# Patient Record
Sex: MALE | Race: WHITE | URBAN - METROPOLITAN AREA
[De-identification: names, ages, dates, MRNs, and addresses within clinical notes are randomized per-mention and may not be internally consistent; named-entity substitution may affect disease eponyms.]

---

## 2019-01-11 ENCOUNTER — OFFICE VISIT (OUTPATIENT)
Dept: ORTHOPEDICS | Facility: CLINIC | Age: 59
End: 2019-01-11
Payer: COMMERCIAL

## 2019-01-11 VITALS
SYSTOLIC BLOOD PRESSURE: 125 MMHG | DIASTOLIC BLOOD PRESSURE: 86 MMHG | HEIGHT: 72 IN | BODY MASS INDEX: 22.35 KG/M2 | HEART RATE: 101 BPM | WEIGHT: 165 LBS

## 2019-01-11 DIAGNOSIS — M54.16 LUMBAR BACK PAIN WITH RADICULOPATHY AFFECTING RIGHT LOWER EXTREMITY: Primary | ICD-10-CM

## 2019-01-11 DIAGNOSIS — M51.369 LUMBAR DEGENERATIVE DISC DISEASE: ICD-10-CM

## 2019-01-11 RX ORDER — GABAPENTIN 100 MG/1
100 CAPSULE ORAL 3 TIMES DAILY
Qty: 90 CAPSULE | Refills: 3 | Status: SHIPPED | OUTPATIENT
Start: 2019-01-11 | End: 2020-01-11

## 2019-01-11 RX ORDER — AMLODIPINE BESYLATE 2.5 MG/1
TABLET ORAL
Refills: 0 | COMMUNITY
Start: 2018-12-04

## 2019-01-11 ASSESSMENT — MIFFLIN-ST. JEOR: SCORE: 1607.47

## 2019-01-11 NOTE — LETTER
Date:January 14, 2019      Patient was self referred, no letter generated. Do not send.        [facility] Health Information

## 2019-01-11 NOTE — PROGRESS NOTES
[facility]  Orthopedics  Cesar Staples,   2019     Name: Luca San  MRN: [id number]  Age: 58 year old  : 1960  Referring provider: Referred Self     Chief Complaint:  Lower back pain    Date of Injury:  Late 2018    History of Present Illness:   Luca San is a 58 year old male who presents today for evaluation of low back pain and right leg pain. He reports that in late 2018 he began to have low back pain. He believes his pain was due to either twisting while lifting a heavy box or getting under another heavy object to lift it. His pain started off as quite mild. After a week, however, he began to have severe low back pain and saw his primary care physician to seek treatment. After a few weeks, his pain moved into his leg. He now has pain in his hip, right thigh, and knee.    He has leg pain starting a minute after he stands up. The longer he stands the more pain he experiences. His pain goes away in 15 minutes after he lies down. He has no trouble sleeping on his side or with his legs up on a pillow.     He has tried many different treatment options and none have provided lasting relief from his pain. He tried muscle relaxants first. He then tried prednisone. He had a lumbar epidural spinal injection on 2018. This injection provided some pain relief. He recently had the second injection on . He reports this has not yet given him any benefit. He has tried physical therapy and notes this seems to make his pain worse.  He has not tried gabapentin. He would like a second opinion before looking into surgical options.     He has no weakness. He used to be quite active with bicycling and tennis, though he now can't partake in these activities. He is occasionally using Advil for his pain.    Review of Systems:   A 10-point review of systems was obtained and is negative except for as noted in the HPI.     Medications:   fluorometholone (FML) 0.1 %  "ophthalmic suspension - Place 1 Drop into left eye 4 times daily.   carboxymethylcellulose (REFRESH PLUS) 0.5 % ophthalmic solution - Place 1 Drop into left eye 4 times daily.  acetaminophen-codeine, 300-30 mg, (TYLENOL #3) 300-30 mg tablet - Take 1-2 tablets by mouth every 4 hours if needed for Pain. Max acetaminophen dose: 4000mg in 24 hrs.  ketorolac 0.4 % ophthalmic (ACULAR LS) 0.4 % ophthalmic solution - Place 1 Drop into left eye 4 times daily.  ofloxacin 0.3 % ophthalmic (OCUFLOX) 0.3 % ophthalmic solution - Place 1 Drop into left eye 4 times daily.  tiZANidine (ZANAFLEX) 4 MG tablet  Indications: Strain of lumbar region, initial encounter - Take 1 Tablet by mouth every 6 hours as needed.  amLODIPine (NORVASC) 2.5 MG tablet  Indications: Essential hypertension (HRC) - Take 1 Tablet by mouth daily.    Allergies:  NKDA    Past Medical History:  Hyperlipidemia  Spinal stenosis of lumbar region  Lumbar disc herniation with radiculopathy  Anterolisthesis  Lumbar foraminal stenosis  Idiopathic thrombocytopenia  Mild mitral regurgitation  Myopia  Elbow pain    Past Surgical History:  Knee scope with meniscectomy  Hernia repair    Social History:  Patient is .     Family History:  History reviewed. No pertinent family history.    Physical Examination:  Blood pressure 125/86, pulse 101, height 1.822 m (5' 11.75\"), weight 74.8 kg (165 lb).  General  - normal appearance, in no obvious distress  CV  - normal peripheral perfusion  Pulm  - normal respiratory pattern, non-labored  Musculoskeletal - lumbar spine  - stance: normal gait without limp, no obvious leg length discrepancy, normal heel and toe walk  - inspection: normal bone and joint alignment, no obvious scoliosis  - palpation: no paravertebral or bony tenderness, no hypertonicity  - ROM: normal and painless flexion, extension, left and right sidebending and rotation  - strength: lower extremities 5/5 in all planes  - special tests:  (+) straight leg raise " bilaterally. Straight leg raise on the right elicits pain in the right lateral thigh  (-) slump test  Neuro  - patellar and Achilles DTRs 2+ on left, patellar 1+ on right, no sensory or motor deficit, grossly normal coordination, normal muscle tone  Skin  - no ecchymosis, erythema, warmth, or induration, no obvious rash  Psych  - interactive, appropriate, normal mood and affect    Imaging:   MR LUMBAR SPINE WO IV CONT (11/9/2018):  Per [facility]  CONCLUSION:  1. At L5-S1, there are bilateral L5 pars defects and grade 1/2 anterolisthesis of L5 on S1. Anterolisthesis results in severe left and moderate to severe right neural foraminal narrowing with deformity of the exiting right and left L5 nerve roots.  2. At L3-L4, there is a right paracentral and foraminal disc protrusion narrowing the right lateral recess and contacting and displacing the descending/traversing right L4 nerve roots. Correlate for radiculopathy in this nerve root distribution.      Assessment:   58 year old male with known lumbar disc disease and right sided radiculopathy in L4 distribution over the last 4.5 months presenting to discuss additional nonsurgical options.  Patient has found little to no benefit from prednisone, muscle relaxant, physical therapy, epidural steroid injection x2, and rest from high-level activities.  At this point we discussed that he has not tried a trial of gabapentin and this may prove to be beneficial.  He may start this at a dose of 300 mg/day and increase to 400 mg if he is seeing some benefit.  We also discussed that given lack of improvement with previous interventions, it is appropriate to consult with a spine surgeon or neurosurgeon.  He does have a visit scheduled with the neurosurgeon next week.     It was a pleasure seeing Luca today.    Cesar Staples DO, [facility]  Primary Care Sports Medicine    I, Cesar Staples DO, have reviewed the above note and agree with the mikhailibtal's notation as written.    Sameer  Disclosure:   I, Yinka Adhikari, am serving as a scribe to document services personally performed by Cesar Staples DO at this visit, based upon the provider's statements to me. All documentation has been reviewed by the aforementioned provider prior to being entered into the official medical record.

## 2019-01-11 NOTE — PROGRESS NOTES
NEW PATIENT INTAKE QUESTIONNAIRE  SPORTS & ORTHOPEDIC WALK-IN 1/11/2019    Primary Care Physician:      [facility]    Hx of LBP Sept 2018 has [facility] Lumbar FLACO 12/28/18- reports no relief. Has had 2 L FLACO  Does report in Sept was lifting a heavy box and turning. Another incident was using back to help lifting something. Unsure if either or both contributed  Reports radiculopathy down R leg. Had done PT- did not help. FLACO- first one helped, 2nd no relief.    Reason for Visit:    What part of your body is injured / painful?  right low back    What caused the injury /pain? Unsure    How long ago did your injury occur or pain begin? several months ago    What are your most bothersome symptoms? Pain    How would you characterize your symptom? aching, tingling and numb    What makes your symptoms better? Other: Laying down    What makes your symptoms worse? Standing, Walking and Sitting    Have you been previously seen for this problem? Yes, [facility]    Medical History:    Medical History: Hypertension    Have you had surgery on this body part before? No    Medications: Amlodipine    Allergies: No known drug allergies    Family History of Medical Problems: NA    Previous Surgeries: B/L knee scopes, hernia    Social History:    Occupation: [employer]    Handedness: Right    Exercise: 1-2 days/week    Review of Systems:    Have you recently had a a fever, chills, weight loss? No    Do you have any vision problems? No    Do you have any chest pain or edema? No    Do you have any shortness of breath or wheezing?  No    Do you have stomach problems? No    Do you have any numbness or focal weakness? No    Do you have diabetes? No    Do you have problems with bleeding or clotting? No    Do you have an rashes or other skin lesions? No

## 2019-01-11 NOTE — PATIENT INSTRUCTIONS
WHAT IS A HERNIATED DISK?    A herniated disk is a disk that has bulged out from its proper place in your neck or back. Disks are rubbery cushions between the bones of the spine (vertebrae). Disks act as shock absorbers between each of the bones of the spine. When a disk bulges out, it may press on nearby nerves and cause pain and other symptoms.    Sometimes a herniated disk is called a ruptured disk.    WHAT IS THE CAUSE?    A herniated disk most often results from wear and tear on the spine as you get older. Sometimes it s caused by an injury. You may be more likely to have a herniated disk if you keep straining your back. This could happen, for example, from not using proper technique when you lift, push, or pull something heavy. Being overweight can also put extra stress on your back. You may also be at higher risk for a herniated disk if:    You are a smoker.  You sit for long periods of time without lower back support.  You drive a lot--for example, you are a .  WHAT ARE THE SYMPTOMS?    Symptoms of a herniated disk may start slowly or suddenly. Where you have symptoms depends on where the herniated disk is in your spine. The most common symptoms are numbness, tingling, pain, or weakness in your buttocks, shoulders, legs, or arms.    HOW IS IT DIAGNOSED?    Your healthcare provider will ask about your symptoms, activities, and medical history. Your provider will examine your spine. Tests may include:    Tests of the movement and reflexes of your arms and legs  X-rays or other types of scans of your spine  Electromyogram, which is a test of electrical activity in your muscles  HOW IS IT TREATED?    Your healthcare provider may recommend:    Rest. It's best to try to stay active, so try not to rest in bed longer than 1 to 2 days or the time your provider recommends.  Medicine. Several types of medicines may help lessen back pain. It may be medicine you take by mouth, or your provider may give a  steroid shot into your spine. Take all medicine as recommended by your healthcare provider.  Physical therapy. This may include massage, traction (force applied to your spine to help relieve pressure on your nerves), or other treatments. You may be given exercises to help strengthen your back so you are less likely to hurt it. You may learn how to protect your back when you are working or playing sports.  A neck collar or neck brace. Wearing a brace for a short time may help keep your neck in the right position while it is healing.  With treatment, the pain should get better within a few weeks, but you may keep having some pain for a few months. If you keep having symptoms, your provider may recommend surgery, but usually surgery isn t needed.    HOW CAN I TAKE CARE OF MYSELF?    To help relieve pain:    Take pain medicine according to your healthcare provider s instructions.  Put an ice pack, gel pack, or package of frozen vegetables wrapped in a cloth on the painful area every 3 to 4 hours for up to 20 minutes at a time. After a few days, a heating pad set on low, or a covered hot water bottle, may also help.  Always use good posture to keep extra pressure off your spine.    Stand up straight with your shoulders back and your belly in. If you have to stand for a long time, move around often and shift your weight from one foot to another. If possible, put one foot up on a footrest that is about 6 to 8 inches high. This keeps your back straight and puts less pressure on your spine.  Sit in chairs that give good support for your lower back Keep your feet flat on the floor or up on a foot rest. Get up every 20 minutes or so and stretch.  When you need to lift something heavy, don't bend from your waist. Bend your knees and squat down by the thing you are lifting. Keep your back as straight as possible. Use your thigh muscles instead of your back to do the lifting. Don t twist. Always keep things close to your body  when you lift, lower, or carry them.    When you sleep, find the position that s most comfortable for you and that supports your back. For example:    Lie flat on your back on a firm mattress or on a mattress with a stiff board under it. Put a pillow under your knees when you lie on your back.  Lie on your belly with a pillow under your chest  Lie on your side with a pillow between your legs.  If you cannot get comfortable, try lying flat on your back with your legs raised so that your knees are bent at a 90-degree angle. This is the same angle they would be if you were sitting up straight in a chair. One way to rest in this position is to lie on the floor, bend your knees, and rest your lower legs on the seat of a chair.  Follow your healthcare provider's instructions, including any exercises recommended by your provider. Ask your provider:    How and when you will hear your test results  How long it will take to recover  What activities you should avoid and when you can return to your normal activities  How to take care of yourself at home  What symptoms or problems you should watch for and what to do if you have them  Make sure you know when you should come back for a checkup.    HOW CAN I HELP PREVENT A HERNIATED DISK?    Keep your muscles strong so that they can help support your spine better. Walking and swimming are examples of good exercise for strengthening and protecting your spine.  Lose weight if you are overweight.  Practice good posture.        Herniated Disc Exercises    Side plank: Lie on your side with your legs, hips, and shoulders in a straight line. Prop yourself up onto your forearm with your elbow directly under your shoulder. Lift your hips off the floor and balance on your forearm and the outside of your foot. Try to hold this position for 15 seconds and then slowly lower your hip to the ground. Switch sides and repeat. Work up to holding for 1 minute. This exercise can be made easier by  starting with your knees and hips flexed toward your chest.    Gluteal stretch: Lie on your back with both knees bent. Rest your right ankle over the knee of your left leg. Grasp the thigh of the left leg and pull toward your chest. You will feel a stretch along the buttocks and possibly along the outside of your hip. Hold the stretch for 15 to 30 seconds. Then repeat the exercise with your left ankle over your right knee. Do the exercise 3 times with each leg.    Quadruped arm and leg raise: Get down on your hands and knees. Pull in your belly button and tighten your abdominal muscles to stiffen your spine. While keeping your abdominals tight, raise one arm and the opposite leg away from you. Hold this position for 5 seconds. Lower your arm and leg slowly and change sides. Do this 10 times on each side.    Extension exercise  Lie face down on the floor for 5 minutes. If this hurts too much, lie face down with a pillow under your stomach. This should relieve your leg or back pain. When you can lie on your stomach for 5 minutes without a pillow, you can continue with Part B of this exercise.    After lying on your stomach for 5 minutes, prop yourself up on your elbows for another 5 minutes. If you can do this without having more leg or buttock pain, you can start doing part C of this exercise.    Lie on your stomach with your hands under your shoulders. Then press down on your hands and extend your elbows while keeping your hips flat on the floor. Hold for 1 second and lower yourself to the floor. Do 3 to 5 sets of 10 repetitions. Rest for 1 minute between sets. You should have no pain in your legs when you do this, but it is normal to feel some pain in your lower back.    Do this exercise several times a day.    Dead bug: Lie on your back with your knees bent, arms at your sides, and feet flat on the floor. Draw in your abdomen and tighten your abdominal muscles. While keeping your abdominal muscles tight and knees  bent, lift one leg several inches off the floor, hold for 5 seconds, and then lower it. Repeat this exercise with the opposite leg. Then lift your arm over your head, hold for 5 seconds, and then lower it. Repeat with the opposite arm. Do 5 repetitions with each leg and arm.  Once this exercise gets easy, raise one leg and the opposite arm together. Hold for 5 seconds. Lower your arm and leg and raise the opposite arm and leg up and hold for 5 seconds. Do 3 sets of 5 repetitions.    Walking is also good exercise for you.    If you have a herniated disk, you should not drive or sit for more than 30 minutes at a time.    Developed by [employer].  Published by [employer].  Copyright  2014 [employer] and/or one of its subsidiaries. All rights reserved.

## 2022-11-21 NOTE — LETTER
1/11/2019       RE: Luca San  [address]  [facility] [address]     Dear Colleague,    Thank you for referring your patient, Luca San, to the [facility] SPORTS AND ORTHOPAEDIC WALK IN CLINIC at [facility]. Please see a copy of my visit note below.         NEW PATIENT INTAKE QUESTIONNAIRE  SPORTS & ORTHOPEDIC WALK-IN 1/11/2019    Primary Care Physician:      Where are the majority of your medical records?  [facility]    Hx of LBP Sept 2018 has had MRI and [facility]R [facility] Lumbar FLCAO 12/28/18- reports no relief. Has had 2 L FLACO  Does report in Sept was lifting a heavy box and turning. Another incident was using back to help lifting something. Unsure if either or both contributed  Reports radiculopathy down R leg. Had done PT- did not help. FLACO- first one helped, 2nd no relief.    Reason for Visit:    What part of your body is injured / painful?  right low back    What caused the injury /pain? Unsure    How long ago did your injury occur or pain begin? several months ago    What are your most bothersome symptoms? Pain    How would you characterize your symptom? aching, tingling and numb    What makes your symptoms better? Other: Laying down    What makes your symptoms worse? Standing, Walking and Sitting    Have you been previously seen for this problem? Yes, [facility]    Medical History:    Medical History: Hypertension    Have you had surgery on this body part before? No    Medications: Amlodipine    Allergies: No known drug allergies    Family History of Medical Problems: NA    Previous Surgeries: B/L knee scopes, hernia    Social History:    Occupation: [employer]    Handedness: Right    Exercise: 1-2 days/week    Review of Systems:    Have you recently had a a fever, chills, weight loss? No    Do you have any vision problems? No    Do you have any chest pain or edema? No    Do you have any shortness of breath or wheezing?  No    Do you have  stomach problems? No    Do you have any numbness or focal weakness? No    Do you have diabetes? No    Do you have problems with bleeding or clotting? No    Do you have an rashes or other skin lesions? No               [facility]  Orthopedics  Cesar Staples DO  2019     Name: Luca San  MRN: [id number]  Age: 58 year old  : 1960  Referring provider: Referred Self     Chief Complaint:  Lower back pain    Date of Injury:      History of Present Illness:   Luca San is a 58 year old male who presents today for evaluation of low back pain and right leg pain. He reports that in late 2018 he began to have low back pain. He believes his pain was due to either twisting while lifting a heavy box or getting under another heavy object to lift it. His pain started off as quite mild. After a week, however, he began to have severe low back pain and saw his primary care physician to seek treatment. After a few weeks, his pain moved into his leg. He now has pain in his hip, right thigh, and knee.    He has leg pain starting a minute after he stands up. The longer he stands the more pain he experiences. His pain goes away in 15 minutes after he lies down. He has no trouble sleeping on his side or with his legs up on a pillow.     He has tried many different treatment options and none have provided lasting relief from his pain. He tried muscle relaxants first. He then tried prednisone. He had a lumbar epidural spinal injection on 2018. This injection provided some pain relief. He recently had the second injection on . He reports this has not yet given him any benefit. He has tried physical therapy and notes this seems to make his pain worse.  He has not tried gabapentin. He would like a second opinion before looking into surgical options.     He has no weakness. He used to be quite active with bicycling and tennis, though he now can't partake in these  "activities. He is occasionally using Advil for his pain.    Review of Systems:   A 10-point review of systems was obtained and is negative except for as noted in the HPI.     Medications:   fluorometholone (FML) 0.1 % ophthalmic suspension - Place 1 Drop into left eye 4 times daily.   carboxymethylcellulose (REFRESH PLUS) 0.5 % ophthalmic solution - Place 1 Drop into left eye 4 times daily.  acetaminophen-codeine, 300-30 mg, (TYLENOL #3) 300-30 mg tablet - Take 1-2 tablets by mouth every 4 hours if needed for Pain. Max acetaminophen dose: 4000mg in 24 hrs.  ketorolac 0.4 % ophthalmic (ACULAR LS) 0.4 % ophthalmic solution - Place 1 Drop into left eye 4 times daily.  ofloxacin 0.3 % ophthalmic (OCUFLOX) 0.3 % ophthalmic solution - Place 1 Drop into left eye 4 times daily.  tiZANidine (ZANAFLEX) 4 MG tablet  Indications: Strain of lumbar region, initial encounter - Take 1 Tablet by mouth every 6 hours as needed.  amLODIPine (NORVASC) 2.5 MG tablet  Indications: Essential hypertension (HRC) - Take 1 Tablet by mouth daily.    Allergies:  NKDA    Past Medical History:  Hyperlipidemia  Spinal stenosis of lumbar region  Lumbar disc herniation with radiculopathy  Anterolisthesis  Lumbar foraminal stenosis  Idiopathic thrombocytopenia  Mild mitral regurgitation  Myopia  Elbow pain    Past Surgical History:  Knee scope with meniscectomy  Hernia repair    Social History:  Patient is .     Family History:  History reviewed. No pertinent family history.    Physical Examination:  Blood pressure 125/86, pulse 101, height 1.822 m (5' 11.75\"), weight 74.8 kg (165 lb).  General  - normal appearance, in no obvious distress  CV  - normal peripheral perfusion  Pulm  - normal respiratory pattern, non-labored  Musculoskeletal - lumbar spine  - stance: normal gait without limp, no obvious leg length discrepancy, normal heel and toe walk  - inspection: normal bone and joint alignment, no obvious scoliosis  - palpation: no " paravertebral or bony tenderness, no hypertonicity  - ROM: normal and painless flexion, extension, left and right sidebending and rotation  - strength: lower extremities 5/5 in all planes  - special tests:  (+) straight leg raise bilaterally. Straight leg raise on the right elicits pain in the right lateral thigh  (-) slump test  Neuro  - patellar and Achilles DTRs 2+ on left, patellar 1+ on right, no sensory or motor deficit, grossly normal coordination, normal muscle tone  Skin  - no ecchymosis, erythema, warmth, or induration, no obvious rash  Psych  - interactive, appropriate, normal mood and affect    Imaging:   MR LUMBAR SPINE WO IV CONT (11/9/2018):  Per [facility]  CONCLUSION:  1. At L5-S1, there are bilateral L5 pars defects and grade 1/2 anterolisthesis of L5 on S1. Anterolisthesis results in severe left and moderate to severe right neural foraminal narrowing with deformity of the exiting right and left L5 nerve roots.  2. At L3-L4, there is a right paracentral and foraminal disc protrusion narrowing the right lateral recess and contacting and displacing the descending/traversing right L4 nerve roots. Correlate for radiculopathy in this nerve root distribution.      Assessment:   58 year old male with known lumbar disc disease and right sided radiculopathy in L4 distribution over the last 4.5 months presenting to discuss additional nonsurgical options.  Patient has found little to no benefit from prednisone, muscle relaxant, physical therapy, epidural steroid injection x2, and rest from high-level activities.  At this point we discussed that he has not tried a trial of gabapentin and this may prove to be beneficial.  He may start this at a dose of 300 mg/day and increase to 400 mg if he is seeing some benefit.  We also discussed that given lack of improvement with previous interventions, it is appropriate to consult with a spine surgeon or neurosurgeon.  He does have a visit scheduled with the  neurosurgeon next week.     It was a pleasure seeing Luca today.    Cesar Staples DO, [facility]  Primary Care Sports Medicine    I, Cesar Staples DO, have reviewed the above note and agree with the scribe's notation as written.    Scribe Disclosure:   I, Yinka Adhikari, am serving as a scribe to document services personally performed by Cesar Staples DO at this visit, based upon the provider's statements to me. All documentation has been reviewed by the aforementioned provider prior to being entered into the official medical record.        Again, thank you for allowing me to participate in the care of your patient.      Sincerely,    Cesar Staples DO       Keystone Flap Text: The defect edges were debeveled with a #15 scalpel blade.  Given the location of the defect, shape of the defect a keystone flap was deemed most appropriate.  Using a sterile surgical marker, an appropriate keystone flap was drawn incorporating the defect, outlining the appropriate donor tissue and placing the expected incisions within the relaxed skin tension lines where possible. The area thus outlined was incised deep to adipose tissue with a #15 scalpel blade.  The skin margins were undermined to an appropriate distance in all directions around the primary defect and laterally outward around the flap utilizing iris scissors.